# Patient Record
Sex: MALE | Race: WHITE | Employment: UNEMPLOYED | URBAN - METROPOLITAN AREA
[De-identification: names, ages, dates, MRNs, and addresses within clinical notes are randomized per-mention and may not be internally consistent; named-entity substitution may affect disease eponyms.]

---

## 2025-01-24 ENCOUNTER — HOSPITAL ENCOUNTER (OUTPATIENT)
Dept: RADIOLOGY | Facility: EXTERNAL LOCATION | Age: 40
Discharge: HOME | End: 2025-01-24

## 2025-01-24 ENCOUNTER — OFFICE VISIT (OUTPATIENT)
Dept: ORTHOPEDIC SURGERY | Facility: CLINIC | Age: 40
End: 2025-01-24
Payer: MEDICAID

## 2025-01-24 VITALS — WEIGHT: 178 LBS | HEIGHT: 78 IN | BODY MASS INDEX: 20.59 KG/M2

## 2025-01-24 DIAGNOSIS — M89.8X1 PAIN OF LEFT SCAPULA: ICD-10-CM

## 2025-01-24 DIAGNOSIS — M79.18 MYOFASCIAL PAIN SYNDROME OF THORACIC SPINE: Primary | ICD-10-CM

## 2025-01-24 PROCEDURE — 1036F TOBACCO NON-USER: CPT | Performed by: FAMILY MEDICINE

## 2025-01-24 PROCEDURE — 99203 OFFICE O/P NEW LOW 30 MIN: CPT | Performed by: FAMILY MEDICINE

## 2025-01-24 PROCEDURE — 3008F BODY MASS INDEX DOCD: CPT | Performed by: FAMILY MEDICINE

## 2025-01-24 NOTE — PROGRESS NOTES
History of Present Illness   Chief Complaint   Patient presents with    Left Shoulder - Pain     Noticed pain in the shoulder/scapular area while doing a dental cleaning 2017  +n/t -lrom        The patient is 39 y.o. male  here with a complaint of left shoulder pain.  This is a chronic issue for the past 7 or 8 years.  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  Patient says that he developed pain in the posterior shoulder as well as some pain in the axillary region and chest wall.  Patient says that he switched to part-time work a few years ago because of ongoing pain, stopped working altogether 1 year ago because of worsening discomfort in the shoulder.  Patient says that he started seeing a chiropractor last year, did do some exercises for the shoulder as well as some manual manipulation, had some improvement in symptoms with this but symptom improvement did plateau and continued to have ongoing symptoms.  He says majority of pain is in the posterior shoulder along the shoulder blade region with some radiation of pain into his left sided neck at times, he will still get some discomfort in the anterior chest wall.  He notes an itching sensation in the back of his shoulder throughout the day as well, unsure if this is related.  He denies any skin changes in the posterior shoulder.  He has not done any soft tissue work aside from a massage gun at home, does have some areas of sensitivity in the posterior shoulder with this.  He did have some x-rays of his back from the chiropractor.  He does take over-the-counter medications as needed.  He says pain is relatively constant throughout the day, reaching across his body with his left arm will sometimes exacerbate pain in the shoulder, he says he does get flareups of more significant discomfort typically these are without any known inciting incident.    No past medical history on  file.    Medication Documentation Review Audit    **Prior to Admission medications have not yet been reviewed**         No Known Allergies    Social History     Socioeconomic History    Marital status: Single     Spouse name: Not on file    Number of children: Not on file    Years of education: Not on file    Highest education level: Not on file   Occupational History    Not on file   Tobacco Use    Smoking status: Never    Smokeless tobacco: Never   Substance and Sexual Activity    Alcohol use: Yes     Comment: rarely    Drug use: Never    Sexual activity: Not on file   Other Topics Concern    Not on file   Social History Narrative    Not on file     Social Drivers of Health     Financial Resource Strain: Not on file   Food Insecurity: Not on file   Transportation Needs: Not on file   Physical Activity: Not on file   Stress: Not on file   Social Connections: Not on file   Intimate Partner Violence: Not on file   Housing Stability: Not on file       No past surgical history on file.       Review of Systems   GENERAL: Negative  GI: Negative  MUSCULOSKELETAL: See HPI  SKIN: Negative  NEURO:  Negative     Physical Exam:    General/Constitutional: well appearing, no distress, appears stated age  HEENT: sclera clear  Respiratory: non labored breathing  Vascular: No edema, swelling or tenderness, except as noted in detailed exam.  Integumentary: No impressive skin lesions present, except as noted in detailed exam.  Neurological:  Alert and oriented   Psychological:  Normal mood and affect.  Musculoskeletal: Normal, except as noted in detailed exam and in HPI    Left shoulder: Normal appearance, there is no skin changes, no definitive muscle atrophy.  There is some generalized tenderness palpation in the posterior shoulder along the medial rhomboids, there is some pain overlying the scapula, there is some pain at the lower cervical paraspinal muscles and trapezius.  No significant lateral or anterior shoulder pain, no  tenderness along the chest wall or at the costochondral junction.  He has full range of motion in the shoulder in all directions, admits to some mild posterior discomfort at endrange of abduction.  There is no significant weakness with rotator cuff strength testing in any direction, he admits to some anterior shoulder pain with resisted internal rotation.  Negative Wyatt, negative Neer's, negative Speed, negative Yergason.    Cervical spine: There is some mild lower cervical paraspinal muscle tenderness on the left no midline tenderness.  He has preserved mobility of the cervical spine in all directions without exacerbation of pain, negative Spurling maneuver.       Imaging: No new imaging today, x-rays of cervical spine unremarkable      Assessment   1. Myofascial pain syndrome of thoracic spine        2. Pain of left scapula  XR shoulder left 2+ views    Point of Care Ultrasound            Plan: Patient having some chronic pain in the posterior shoulder suggestive of some chronic myofascial pain, we discussed possibility of some scapulothoracic bursitis playing a role.  I do think would benefit from some more soft tissue modalities in the shoulder including possible dry needling, ultrasound, cupping to see if this may help with his pain.  We discussed role of possible scapulothoracic bursa injection with Kenalog which he would consider.  He has a son here with him today and would like to reschedule for an injection in the near future.  He will consider returning back to his chiropractor for some of those soft tissue modalities, could offer referral to physical therapist in the area for this as well.  All questions and concerns were answered.